# Patient Record
Sex: MALE | Race: WHITE | Employment: FULL TIME | URBAN - METROPOLITAN AREA
[De-identification: names, ages, dates, MRNs, and addresses within clinical notes are randomized per-mention and may not be internally consistent; named-entity substitution may affect disease eponyms.]

---

## 2023-05-01 ENCOUNTER — HOSPITAL ENCOUNTER (OUTPATIENT)
Facility: HOSPITAL | Age: 28
Discharge: HOME | End: 2023-05-03
Payer: COMMERCIAL

## 2023-05-01 ENCOUNTER — HOSPITAL ENCOUNTER (OUTPATIENT)
Facility: HOSPITAL | Age: 28
Discharge: HOME | End: 2023-05-04
Payer: COMMERCIAL

## 2023-05-01 DIAGNOSIS — J34.3 HYPERTROPHY OF NASAL TURBINATES: ICD-10-CM

## 2023-05-01 DIAGNOSIS — J34.2 DEVIATED NASAL SEPTUM: ICD-10-CM

## 2023-05-01 LAB
EKG ATRIAL RATE: 80 BPM
EKG DIAGNOSIS: NORMAL
EKG P AXIS: 17 DEGREES
EKG P-R INTERVAL: 150 MS
EKG Q-T INTERVAL: 350 MS
EKG QRS DURATION: 86 MS
EKG QTC CALCULATION (BAZETT): 403 MS
EKG R AXIS: 14 DEGREES
EKG T AXIS: 28 DEGREES
EKG VENTRICULAR RATE: 80 BPM
HCT VFR BLD AUTO: 54.7 % (ref 36–48)
HGB BLD-MCNC: 18.1 G/DL (ref 13–16)
POTASSIUM SERPL-SCNC: 4 MMOL/L (ref 3.5–5.5)

## 2023-05-01 PROCEDURE — 36415 COLL VENOUS BLD VENIPUNCTURE: CPT

## 2023-05-01 PROCEDURE — 85014 HEMATOCRIT: CPT

## 2023-05-01 PROCEDURE — 84132 ASSAY OF SERUM POTASSIUM: CPT

## 2023-05-01 PROCEDURE — 85018 HEMOGLOBIN: CPT

## 2023-05-01 PROCEDURE — 93005 ELECTROCARDIOGRAM TRACING: CPT

## 2023-05-08 ENCOUNTER — HOSPITAL ENCOUNTER (OUTPATIENT)
Facility: HOSPITAL | Age: 28
Setting detail: SPECIMEN
Discharge: HOME | End: 2023-05-11
Payer: COMMERCIAL

## 2023-05-08 PROCEDURE — 88304 TISSUE EXAM BY PATHOLOGIST: CPT

## 2023-05-08 PROCEDURE — 88311 DECALCIFY TISSUE: CPT

## 2023-11-15 RX ORDER — ROSUVASTATIN CALCIUM 20 MG/1
20 TABLET, COATED ORAL DAILY
COMMUNITY

## 2023-11-15 RX ORDER — SEMAGLUTIDE 1.34 MG/ML
INJECTION, SOLUTION SUBCUTANEOUS WEEKLY
COMMUNITY

## 2023-11-15 RX ORDER — TESTOSTERONE CYPIONATE, ALCOHOL 200 MG/ML
KIT INTRAMUSCULAR WEEKLY
COMMUNITY

## 2023-11-15 RX ORDER — PROPRANOLOL HYDROCHLORIDE 120 MG/1
120 CAPSULE, EXTENDED RELEASE ORAL DAILY
COMMUNITY

## 2023-11-15 RX ORDER — FLUTICASONE PROPIONATE 50 MCG
1 SPRAY, SUSPENSION (ML) NASAL DAILY
COMMUNITY

## 2023-11-15 RX ORDER — EMPAGLIFLOZIN, METFORMIN HYDROCHLORIDE 12.5; 1 MG/1; MG/1
2 TABLET, EXTENDED RELEASE ORAL DAILY
COMMUNITY

## 2023-11-15 RX ORDER — FEXOFENADINE HCL 180 MG/1
180 TABLET ORAL DAILY
COMMUNITY

## 2023-11-15 RX ORDER — VENLAFAXINE HYDROCHLORIDE 37.5 MG/1
37.5 CAPSULE, EXTENDED RELEASE ORAL DAILY
COMMUNITY

## 2023-11-15 NOTE — PERIOP NOTE
PAT Activity Status Questionnaire       Yes No Points for YES    Are you able to climb a flight of stairs or walk up a hill? [x] [] 1   Are you able to do heavy work around the house like lifting or moving heavy furniture? [x] [] 1   Do yardwork like raking leaves, weeding or pushing a power mower? [x] [] 1   Participate in strenuous sports like swimming, singles tennis, football, basketball or skiing? [x] [] 1   Total Score:       4       If TOTAL SCORE is <3, send chart for anesthesia review. Instructed to bring CPAP on admission. No removable prosthetic devices or family history of malignant hyperthermia. CHG wash x 3 days instructions reviewed. PCP is not aware of the surgery. No participation in clinical trial or research study. Do not bring any valuables on DOS- jewelry, wallet, cash, laptop, medications. Does not meet criteria for special population at this time. Possible time delay day of surgery reviewed. Instructed to bring Insurance card and ID on the day of surgery. DNR status- none. To come 11- for CBC, CMP, UA, Urine culture, EKG. Needs CHG also.

## 2023-11-20 ENCOUNTER — HOSPITAL ENCOUNTER (OUTPATIENT)
Facility: HOSPITAL | Age: 28
Discharge: HOME | End: 2023-11-23
Payer: COMMERCIAL

## 2023-11-20 DIAGNOSIS — N20.1 RIGHT URETERAL STONE: ICD-10-CM

## 2023-11-20 DIAGNOSIS — Z01.818 PRE-OP TESTING: ICD-10-CM

## 2023-11-20 LAB
ANION GAP SERPL CALC-SCNC: 5 MMOL/L (ref 3–18)
APPEARANCE UR: CLEAR
BILIRUB UR QL: NEGATIVE
BUN SERPL-MCNC: 16 MG/DL (ref 7–18)
BUN/CREAT SERPL: 17 (ref 12–20)
CALCIUM SERPL-MCNC: 9.5 MG/DL (ref 8.5–10.1)
CHLORIDE SERPL-SCNC: 103 MMOL/L (ref 100–111)
CO2 SERPL-SCNC: 33 MMOL/L (ref 21–32)
COLOR UR: YELLOW
CREAT SERPL-MCNC: 0.94 MG/DL (ref 0.6–1.3)
EKG ATRIAL RATE: 72 BPM
EKG DIAGNOSIS: NORMAL
EKG P AXIS: 27 DEGREES
EKG P-R INTERVAL: 142 MS
EKG Q-T INTERVAL: 378 MS
EKG QRS DURATION: 86 MS
EKG QTC CALCULATION (BAZETT): 413 MS
EKG R AXIS: 57 DEGREES
EKG T AXIS: 33 DEGREES
EKG VENTRICULAR RATE: 72 BPM
ERYTHROCYTE [DISTWIDTH] IN BLOOD BY AUTOMATED COUNT: 12.3 % (ref 11.6–14.5)
EST. AVERAGE GLUCOSE BLD GHB EST-MCNC: 108 MG/DL
GLUCOSE SERPL-MCNC: 89 MG/DL (ref 74–99)
GLUCOSE UR STRIP.AUTO-MCNC: >1000 MG/DL
HBA1C MFR BLD: 5.4 % (ref 4.2–5.6)
HCT VFR BLD AUTO: 51.8 % (ref 36–48)
HGB BLD-MCNC: 17.1 G/DL (ref 13–16)
HGB UR QL STRIP: NEGATIVE
KETONES UR QL STRIP.AUTO: NEGATIVE MG/DL
LEUKOCYTE ESTERASE UR QL STRIP.AUTO: NEGATIVE
MCH RBC QN AUTO: 30.3 PG (ref 24–34)
MCHC RBC AUTO-ENTMCNC: 33 G/DL (ref 31–37)
MCV RBC AUTO: 91.8 FL (ref 78–100)
NITRITE UR QL STRIP.AUTO: NEGATIVE
NRBC # BLD: 0 K/UL (ref 0–0.01)
NRBC BLD-RTO: 0 PER 100 WBC
PH UR STRIP: 5.5 (ref 5–8)
PLATELET # BLD AUTO: 302 K/UL (ref 135–420)
PMV BLD AUTO: 10.3 FL (ref 9.2–11.8)
POTASSIUM SERPL-SCNC: 4 MMOL/L (ref 3.5–5.5)
PROT UR STRIP-MCNC: NEGATIVE MG/DL
RBC # BLD AUTO: 5.64 M/UL (ref 4.35–5.65)
SODIUM SERPL-SCNC: 141 MMOL/L (ref 136–145)
SP GR UR REFRACTOMETRY: >1.03 (ref 1–1.03)
UROBILINOGEN UR QL STRIP.AUTO: 1 EU/DL (ref 0.2–1)
WBC # BLD AUTO: 11.4 K/UL (ref 4.6–13.2)

## 2023-11-20 PROCEDURE — 93005 ELECTROCARDIOGRAM TRACING: CPT | Performed by: UROLOGY

## 2023-11-20 PROCEDURE — 87086 URINE CULTURE/COLONY COUNT: CPT

## 2023-11-20 PROCEDURE — 83036 HEMOGLOBIN GLYCOSYLATED A1C: CPT

## 2023-11-20 PROCEDURE — 85027 COMPLETE CBC AUTOMATED: CPT

## 2023-11-20 PROCEDURE — 80048 BASIC METABOLIC PNL TOTAL CA: CPT

## 2023-11-20 PROCEDURE — 81003 URINALYSIS AUTO W/O SCOPE: CPT

## 2023-11-20 PROCEDURE — 93010 ELECTROCARDIOGRAM REPORT: CPT | Performed by: INTERNAL MEDICINE

## 2023-11-20 PROCEDURE — 36415 COLL VENOUS BLD VENIPUNCTURE: CPT

## 2023-11-21 LAB
BACTERIA SPEC CULT: NORMAL
SERVICE CMNT-IMP: NORMAL

## 2023-12-12 ENCOUNTER — HOSPITAL ENCOUNTER (OUTPATIENT)
Facility: HOSPITAL | Age: 28
Setting detail: OUTPATIENT SURGERY
Discharge: HOME | End: 2023-12-12
Attending: UROLOGY | Admitting: UROLOGY
Payer: COMMERCIAL

## 2023-12-12 ENCOUNTER — ANESTHESIA (OUTPATIENT)
Facility: HOSPITAL | Age: 28
End: 2023-12-12
Payer: COMMERCIAL

## 2023-12-12 ENCOUNTER — ANESTHESIA EVENT (OUTPATIENT)
Facility: HOSPITAL | Age: 28
End: 2023-12-12
Payer: COMMERCIAL

## 2023-12-12 ENCOUNTER — APPOINTMENT (OUTPATIENT)
Facility: HOSPITAL | Age: 28
End: 2023-12-12
Attending: UROLOGY
Payer: COMMERCIAL

## 2023-12-12 VITALS
SYSTOLIC BLOOD PRESSURE: 121 MMHG | OXYGEN SATURATION: 100 % | HEIGHT: 69 IN | DIASTOLIC BLOOD PRESSURE: 99 MMHG | HEART RATE: 76 BPM | RESPIRATION RATE: 16 BRPM | WEIGHT: 231.56 LBS | BODY MASS INDEX: 34.3 KG/M2 | TEMPERATURE: 97.7 F

## 2023-12-12 DIAGNOSIS — Z01.818 PRE-OP TESTING: Primary | ICD-10-CM

## 2023-12-12 DIAGNOSIS — N20.1 URETERAL STONE: ICD-10-CM

## 2023-12-12 PROBLEM — N20.0 KIDNEY STONE: Status: ACTIVE | Noted: 2023-12-12

## 2023-12-12 LAB
GLUCOSE BLD STRIP.AUTO-MCNC: 84 MG/DL (ref 70–110)
GLUCOSE BLD STRIP.AUTO-MCNC: 84 MG/DL (ref 70–110)

## 2023-12-12 PROCEDURE — 3600000002 HC SURGERY LEVEL 2 BASE: Performed by: UROLOGY

## 2023-12-12 PROCEDURE — 82360 CALCULUS ASSAY QUANT: CPT

## 2023-12-12 PROCEDURE — 7100000000 HC PACU RECOVERY - FIRST 15 MIN: Performed by: UROLOGY

## 2023-12-12 PROCEDURE — 2580000003 HC RX 258: Performed by: UROLOGY

## 2023-12-12 PROCEDURE — C1894 INTRO/SHEATH, NON-LASER: HCPCS | Performed by: UROLOGY

## 2023-12-12 PROCEDURE — 82962 GLUCOSE BLOOD TEST: CPT

## 2023-12-12 PROCEDURE — 6360000004 HC RX CONTRAST MEDICATION: Performed by: UROLOGY

## 2023-12-12 PROCEDURE — 7100000011 HC PHASE II RECOVERY - ADDTL 15 MIN: Performed by: UROLOGY

## 2023-12-12 PROCEDURE — 2500000003 HC RX 250 WO HCPCS: Performed by: NURSE ANESTHETIST, CERTIFIED REGISTERED

## 2023-12-12 PROCEDURE — 3700000001 HC ADD 15 MINUTES (ANESTHESIA): Performed by: UROLOGY

## 2023-12-12 PROCEDURE — 3600000012 HC SURGERY LEVEL 2 ADDTL 15MIN: Performed by: UROLOGY

## 2023-12-12 PROCEDURE — 3700000000 HC ANESTHESIA ATTENDED CARE: Performed by: UROLOGY

## 2023-12-12 PROCEDURE — 2720000010 HC SURG SUPPLY STERILE: Performed by: UROLOGY

## 2023-12-12 PROCEDURE — 7100000001 HC PACU RECOVERY - ADDTL 15 MIN: Performed by: UROLOGY

## 2023-12-12 PROCEDURE — 2709999900 HC NON-CHARGEABLE SUPPLY: Performed by: UROLOGY

## 2023-12-12 PROCEDURE — 6360000002 HC RX W HCPCS: Performed by: NURSE ANESTHETIST, CERTIFIED REGISTERED

## 2023-12-12 PROCEDURE — 74420 UROGRAPHY RTRGR +-KUB: CPT

## 2023-12-12 PROCEDURE — C1758 CATHETER, URETERAL: HCPCS | Performed by: UROLOGY

## 2023-12-12 PROCEDURE — C2617 STENT, NON-COR, TEM W/O DEL: HCPCS | Performed by: UROLOGY

## 2023-12-12 PROCEDURE — 6360000002 HC RX W HCPCS: Performed by: STUDENT IN AN ORGANIZED HEALTH CARE EDUCATION/TRAINING PROGRAM

## 2023-12-12 PROCEDURE — 7100000010 HC PHASE II RECOVERY - FIRST 15 MIN: Performed by: UROLOGY

## 2023-12-12 PROCEDURE — 6360000002 HC RX W HCPCS: Performed by: UROLOGY

## 2023-12-12 PROCEDURE — C1769 GUIDE WIRE: HCPCS | Performed by: UROLOGY

## 2023-12-12 DEVICE — VARIABLE LENGTH URETERAL STENT
Type: IMPLANTABLE DEVICE | Site: URETER | Status: FUNCTIONAL
Brand: CONTOUR VL™

## 2023-12-12 RX ORDER — DIPHENHYDRAMINE HYDROCHLORIDE 50 MG/ML
12.5 INJECTION INTRAMUSCULAR; INTRAVENOUS
Status: DISCONTINUED | OUTPATIENT
Start: 2023-12-12 | End: 2023-12-12 | Stop reason: HOSPADM

## 2023-12-12 RX ORDER — TRAMADOL HYDROCHLORIDE 50 MG/1
50 TABLET ORAL EVERY 6 HOURS PRN
Qty: 20 TABLET | Refills: 0 | Status: SHIPPED | OUTPATIENT
Start: 2023-12-12 | End: 2023-12-17

## 2023-12-12 RX ORDER — HYDROMORPHONE HYDROCHLORIDE 1 MG/ML
0.5 INJECTION, SOLUTION INTRAMUSCULAR; INTRAVENOUS; SUBCUTANEOUS EVERY 5 MIN PRN
Status: DISCONTINUED | OUTPATIENT
Start: 2023-12-12 | End: 2023-12-12 | Stop reason: HOSPADM

## 2023-12-12 RX ORDER — FENTANYL CITRATE 50 UG/ML
INJECTION, SOLUTION INTRAMUSCULAR; INTRAVENOUS PRN
Status: DISCONTINUED | OUTPATIENT
Start: 2023-12-12 | End: 2023-12-12 | Stop reason: SDUPTHER

## 2023-12-12 RX ORDER — MIDAZOLAM HYDROCHLORIDE 1 MG/ML
INJECTION INTRAMUSCULAR; INTRAVENOUS PRN
Status: DISCONTINUED | OUTPATIENT
Start: 2023-12-12 | End: 2023-12-12 | Stop reason: SDUPTHER

## 2023-12-12 RX ORDER — MIDAZOLAM HYDROCHLORIDE 2 MG/2ML
2 INJECTION, SOLUTION INTRAMUSCULAR; INTRAVENOUS
Status: DISCONTINUED | OUTPATIENT
Start: 2023-12-12 | End: 2023-12-12 | Stop reason: HOSPADM

## 2023-12-12 RX ORDER — SUCCINYLCHOLINE/SOD CL,ISO/PF 100 MG/5ML
SYRINGE (ML) INTRAVENOUS PRN
Status: DISCONTINUED | OUTPATIENT
Start: 2023-12-12 | End: 2023-12-12 | Stop reason: SDUPTHER

## 2023-12-12 RX ORDER — LABETALOL HYDROCHLORIDE 5 MG/ML
10 INJECTION, SOLUTION INTRAVENOUS
Status: DISCONTINUED | OUTPATIENT
Start: 2023-12-12 | End: 2023-12-12 | Stop reason: HOSPADM

## 2023-12-12 RX ORDER — ONDANSETRON 2 MG/ML
4 INJECTION INTRAMUSCULAR; INTRAVENOUS
Status: DISCONTINUED | OUTPATIENT
Start: 2023-12-12 | End: 2023-12-12 | Stop reason: HOSPADM

## 2023-12-12 RX ORDER — SODIUM CHLORIDE 0.9 % (FLUSH) 0.9 %
5-40 SYRINGE (ML) INJECTION EVERY 12 HOURS SCHEDULED
Status: DISCONTINUED | OUTPATIENT
Start: 2023-12-12 | End: 2023-12-12 | Stop reason: HOSPADM

## 2023-12-12 RX ORDER — SODIUM CHLORIDE, SODIUM LACTATE, POTASSIUM CHLORIDE, CALCIUM CHLORIDE 600; 310; 30; 20 MG/100ML; MG/100ML; MG/100ML; MG/100ML
INJECTION, SOLUTION INTRAVENOUS CONTINUOUS
Status: DISCONTINUED | OUTPATIENT
Start: 2023-12-12 | End: 2023-12-12 | Stop reason: HOSPADM

## 2023-12-12 RX ORDER — LEVOFLOXACIN 5 MG/ML
500 INJECTION, SOLUTION INTRAVENOUS
Status: COMPLETED | OUTPATIENT
Start: 2023-12-12 | End: 2023-12-12

## 2023-12-12 RX ORDER — SODIUM CHLORIDE 0.9 % (FLUSH) 0.9 %
5-40 SYRINGE (ML) INJECTION PRN
Status: DISCONTINUED | OUTPATIENT
Start: 2023-12-12 | End: 2023-12-12 | Stop reason: HOSPADM

## 2023-12-12 RX ORDER — LEVOFLOXACIN 500 MG/1
500 TABLET, FILM COATED ORAL DAILY
Qty: 3 TABLET | Refills: 0 | Status: SHIPPED | OUTPATIENT
Start: 2023-12-12 | End: 2023-12-15

## 2023-12-12 RX ORDER — PROPOFOL 10 MG/ML
INJECTION, EMULSION INTRAVENOUS PRN
Status: DISCONTINUED | OUTPATIENT
Start: 2023-12-12 | End: 2023-12-12 | Stop reason: SDUPTHER

## 2023-12-12 RX ORDER — SODIUM CHLORIDE 9 MG/ML
INJECTION, SOLUTION INTRAVENOUS PRN
Status: DISCONTINUED | OUTPATIENT
Start: 2023-12-12 | End: 2023-12-12 | Stop reason: HOSPADM

## 2023-12-12 RX ORDER — KETOROLAC TROMETHAMINE 10 MG/1
10 TABLET, FILM COATED ORAL EVERY 6 HOURS PRN
Qty: 20 TABLET | Refills: 0 | Status: SHIPPED | OUTPATIENT
Start: 2023-12-12 | End: 2024-12-11

## 2023-12-12 RX ORDER — DEXAMETHASONE SODIUM PHOSPHATE 4 MG/ML
INJECTION, SOLUTION INTRA-ARTICULAR; INTRALESIONAL; INTRAMUSCULAR; INTRAVENOUS; SOFT TISSUE PRN
Status: DISCONTINUED | OUTPATIENT
Start: 2023-12-12 | End: 2023-12-12 | Stop reason: SDUPTHER

## 2023-12-12 RX ORDER — DROPERIDOL 2.5 MG/ML
0.62 INJECTION, SOLUTION INTRAMUSCULAR; INTRAVENOUS
Status: DISCONTINUED | OUTPATIENT
Start: 2023-12-12 | End: 2023-12-12 | Stop reason: HOSPADM

## 2023-12-12 RX ORDER — FENTANYL CITRATE 50 UG/ML
50 INJECTION, SOLUTION INTRAMUSCULAR; INTRAVENOUS EVERY 5 MIN PRN
Status: DISCONTINUED | OUTPATIENT
Start: 2023-12-12 | End: 2023-12-12 | Stop reason: HOSPADM

## 2023-12-12 RX ORDER — IOPAMIDOL 612 MG/ML
INJECTION, SOLUTION INTRATHECAL PRN
Status: DISCONTINUED | OUTPATIENT
Start: 2023-12-12 | End: 2023-12-12 | Stop reason: ALTCHOICE

## 2023-12-12 RX ORDER — ONDANSETRON 2 MG/ML
INJECTION INTRAMUSCULAR; INTRAVENOUS PRN
Status: DISCONTINUED | OUTPATIENT
Start: 2023-12-12 | End: 2023-12-12 | Stop reason: SDUPTHER

## 2023-12-12 RX ORDER — PROPRANOLOL HCL 60 MG
120 CAPSULE, EXTENDED RELEASE 24HR ORAL ONCE
Status: DISCONTINUED | OUTPATIENT
Start: 2023-12-12 | End: 2023-12-12 | Stop reason: HOSPADM

## 2023-12-12 RX ORDER — LIDOCAINE HYDROCHLORIDE 20 MG/ML
INJECTION, SOLUTION EPIDURAL; INFILTRATION; INTRACAUDAL; PERINEURAL PRN
Status: DISCONTINUED | OUTPATIENT
Start: 2023-12-12 | End: 2023-12-12 | Stop reason: SDUPTHER

## 2023-12-12 RX ADMIN — LIDOCAINE HYDROCHLORIDE 5 ML: 20 INJECTION, SOLUTION EPIDURAL; INFILTRATION; INTRACAUDAL; PERINEURAL at 13:19

## 2023-12-12 RX ADMIN — FENTANYL CITRATE 50 MCG: 50 INJECTION, SOLUTION INTRAMUSCULAR; INTRAVENOUS at 13:39

## 2023-12-12 RX ADMIN — SODIUM CHLORIDE, SODIUM LACTATE, POTASSIUM CHLORIDE, AND CALCIUM CHLORIDE: 600; 310; 30; 20 INJECTION, SOLUTION INTRAVENOUS at 11:49

## 2023-12-12 RX ADMIN — ONDANSETRON HYDROCHLORIDE 4 MG: 2 INJECTION INTRAMUSCULAR; INTRAVENOUS at 13:37

## 2023-12-12 RX ADMIN — Medication 100 MG: at 13:19

## 2023-12-12 RX ADMIN — HYDROMORPHONE HYDROCHLORIDE 0.5 MG: 1 INJECTION, SOLUTION INTRAMUSCULAR; INTRAVENOUS; SUBCUTANEOUS at 14:46

## 2023-12-12 RX ADMIN — FENTANYL CITRATE 50 MCG: 50 INJECTION, SOLUTION INTRAMUSCULAR; INTRAVENOUS at 13:19

## 2023-12-12 RX ADMIN — MIDAZOLAM 2 MG: 1 INJECTION INTRAMUSCULAR; INTRAVENOUS at 13:16

## 2023-12-12 RX ADMIN — DEXAMETHASONE SODIUM PHOSPHATE 4 MG: 4 INJECTION, SOLUTION INTRAMUSCULAR; INTRAVENOUS at 13:37

## 2023-12-12 RX ADMIN — SODIUM CHLORIDE, SODIUM LACTATE, POTASSIUM CHLORIDE, AND CALCIUM CHLORIDE: 600; 310; 30; 20 INJECTION, SOLUTION INTRAVENOUS at 14:18

## 2023-12-12 RX ADMIN — HYDROMORPHONE HYDROCHLORIDE 0.5 MG: 1 INJECTION, SOLUTION INTRAMUSCULAR; INTRAVENOUS; SUBCUTANEOUS at 14:38

## 2023-12-12 RX ADMIN — PROPOFOL 200 MG: 10 INJECTION, EMULSION INTRAVENOUS at 13:19

## 2023-12-12 RX ADMIN — LEVOFLOXACIN 500 MG: 5 INJECTION, SOLUTION INTRAVENOUS at 13:23

## 2023-12-12 ASSESSMENT — PAIN - FUNCTIONAL ASSESSMENT
PAIN_FUNCTIONAL_ASSESSMENT: 0-10
PAIN_FUNCTIONAL_ASSESSMENT: ADULT NONVERBAL PAIN SCALE (NPVS)
PAIN_FUNCTIONAL_ASSESSMENT: 0-10

## 2023-12-12 ASSESSMENT — PAIN DESCRIPTION - DESCRIPTORS
DESCRIPTORS: BURNING;ACHING;PRESSURE
DESCRIPTORS: STABBING;SHARP

## 2023-12-12 ASSESSMENT — PAIN SCALES - GENERAL
PAINLEVEL_OUTOF10: 7
PAINLEVEL_OUTOF10: 7
PAINLEVEL_OUTOF10: 4
PAINLEVEL_OUTOF10: 5

## 2023-12-12 ASSESSMENT — PAIN DESCRIPTION - LOCATION
LOCATION: PENIS
LOCATION: PENIS;BACK
LOCATION: PENIS;BACK

## 2023-12-12 ASSESSMENT — PAIN DESCRIPTION - PAIN TYPE: TYPE: SURGICAL PAIN

## 2023-12-12 ASSESSMENT — PAIN DESCRIPTION - ORIENTATION: ORIENTATION: RIGHT

## 2023-12-12 NOTE — H&P
Urology [address] [facility],[address] [address]  Tel:  [phone]  Fax: [phone]    Patient : Luis Gr   YOB: 1995                   Assessment/Plan  # Detail Type Description    1. Assessment Gross hematuria (R31.0). Patient Plan Resolved. 2. Assessment History of kidney stones (X02.204). Patient Plan Patient with current stone right ureter. 3. Assessment Right ureteral stone (N20.1). Patient Plan Discussed with patient today considering his symptoms patient will need to undergo intervention. Unfortunately he has to be well as not a reasonable option considering stone cannot be seen on KUB to likely uric acid stone. Patient will benefit from intervention endoscopically with cystoscopy, right ureteroscopy laser lithotripsy right stent placement. Risk of stent pain discussed. Blood in the urine. Urgency frequency. He will undergo PAT per hospital protocol. All questions answered today. Script 4 Percocet sent today for pain control. Total time counseling coordinating care 30 minutes. Greater than 50% of the time on counseling and/or coordination of care. This time included review of diagnostic testing, prognosis, risks and benefits of treatment options, clinical impression, management instructions, the importance of compliance, and risk factor reduction. Additional Visit Information    This 25year old patient presents for Hematuria. History of Present Illness  1. Hematuria   Pertinent history includes history of kidney stones but not being over 36years of age, daily aspirin use, history of bladder cancer, history of UTIs, exposure to radiation and smoking or trauma. The patient also complains of urinary frequency (every 1 hour). The patient denies chills, dysuria, incomplete emptying, slow stream, urinary dribbling, urinary hesitancy, urinary incontinence, straining to urinate and urgency.  Additional

## 2023-12-12 NOTE — DISCHARGE INSTRUCTIONS
DISCHARGE SUMMARY from Nurse    PATIENT INSTRUCTIONS:    After general anesthesia or intravenous sedation, for 24 hours or while taking prescription Narcotics:  Limit your activities  Do not drive and operate hazardous machinery  Do not make important personal or business decisions  Do  not drink alcoholic beverages  If you have not urinated within 8 hours after discharge, please contact your surgeon on call. Report the following to your surgeon:  Excessive pain, swelling, redness or odor of or around the surgical area  Temperature over 100.5  Nausea and vomiting lasting longer than 4 hours or if unable to take medications  Any signs of decreased circulation or nerve impairment to extremity: change in color, persistent  numbness, tingling, coldness or increase pain  Any questions    What to do at Home:  Recommended activity: ,   REGULAR DIET DRINK LOTS OF LIQUIDS  OK TO SHOWER  PAT DRY AROUND STERI STRIPS ON PENIS  NO HEAVY LIFTING  DR BLANCO WILL CALL REGARDING A POST OP CHECK UP    If you experience any of the following symptoms heavy bleeding, unable to void, fevers,severe pain, please follow up with dr Scotty Carpio. *  Please give a list of your current medications to your Primary Care Provider. *  Please update this list whenever your medications are discontinued, doses are      changed, or new medications (including over-the-counter products) are added. *  Please carry medication information at all times in case of emergency situations. These are general instructions for a healthy lifestyle:    No smoking/ No tobacco products/ Avoid exposure to second hand smoke  Surgeon General's Warning:  Quitting smoking now greatly reduces serious risk to your health.     Obesity, smoking, and sedentary lifestyle greatly increases your risk for illness    A healthy diet, regular physical exercise & weight monitoring are important for maintaining a healthy lifestyle    You may be retaining fluid if you have a

## 2023-12-12 NOTE — PERIOP NOTE
Dr. Doc Perez informed pt last had Ozempic 5 days ago, and propanolol 2 days ago. Order received to give propanolol.

## 2023-12-12 NOTE — BRIEF OP NOTE
Brief Postoperative Note      Patient: Jeannie Britt  YOB: 1995  MRN: [id number]    Date of Procedure: 12/12/2023    Pre-Op Diagnosis Codes:     * Renal stone [N20.0]    Post-Op Diagnosis: Same       Procedure(s):  CYSTOSCOPY, RIGHT RETROGRADE PYELOGRAM WITH INTERPRETATION, RIGHT URETEROSCOPY, THULIUM LASER LITHOTRIPSY, STENT PLACEMENT WITH C-ARM    Surgeon(s):  Ishmael Galeana MD    Assistant:  NONE    Anesthesia: General    Estimated Blood Loss (mL): Minimal    Complications: None    Specimens:   ID Type Source Tests Collected by Time Destination   1 : RIGHT KIDNEY STONE Stone (Calculus) Kidney STONE ANALYSIS Ishmael Galeana MD 12/12/2023 1353        Implants:  Implant Name Type Inv. Item Serial No.  Lot No. LRB No. Used Action   STENT URET 4.8FR [id number] PERCFLX HYDR+ SFT PGTL TAPR TIP - [id number]  STENT URET 4.8FR X76-36MS PERCFLX HYDR+ SFT PGTL TAPR TIP  [employer] [facility] UROLOG[facility]- [id number] Right 1 Implanted         Drains:   Ureteral Drain/Stent 12/12/23 Right Ureter (Active)       Findings: STONE IN LOWER POLE FRAGMENTED EASILY.         Electronically signed by Liv Huddleston MD on 12/12/2023 at 2:18 PM

## 2023-12-12 NOTE — PERIOP NOTE
TRANSFER - OUT REPORT:    Verbal report given to Otilio Lynch  on [employer]  being transferred to phase 2  for routine post-op       Report consisted of patient's Situation, Background, Assessment and   Recommendations(SBAR). Information from the following report(s) Nurse Handoff Report, Adult Overview, Surgery Report, Intake/Output, MAR, Recent Results, and Cardiac Rhythm NSR  was reviewed with the receiving nurse. Lines:   Peripheral IV 12/12/23 Left;Posterior Hand (Active)   Site Assessment Clean, dry & intact 12/12/23 1448   Line Status Infusing 12/12/23 [facility] 16 Connections checked and tightened 12/12/23 1448   Phlebitis Assessment No symptoms 12/12/23 1448   Infiltration Assessment 0 12/12/23 1448   Alcohol Cap Used No 12/12/23 1149   Dressing Status Clean, dry & intact 12/12/23 1448   Dressing Type Transparent 12/12/23 1448   Dressing Intervention New 12/12/23 1149        Opportunity for questions and clarification was provided.       Patient transported with:  Registered Nurse

## 2023-12-12 NOTE — OP NOTE
ureteral catheter and a retrograde pyelogram was done in the usual manner using 14 mL of Isovue dye. There were no filling defects along the entire length of the ureter. Up in the kidney, there were no filling defects. There was no hydronephrosis. Next, I passed a sensor wire through the open-ended catheter up to the kidney. The open-ended catheter was removed. One wire was a safety wire, the other wire was a working wire. Over the working wire, I passed a dual-lumen catheter and passed a second sensor wire up to the kidney. The dual-lumen catheter was removed. Over the working wire, I passed an 11/13, 28-cm navigator sheath into the distal ureter. The inner workings of the sheath and the working wire were then removed. I then passed a flexible ureteroscope through the sheath up to the kidney. There were no stones along the length of the ureter as predicted by the retrograde pyelogram.  Up in the kidney, there was a long large stone in the lower pole. No other stones were seen. Using a ZeroTip basket and I moved the stone into the upper pole to make it easier to fragment and pass fragments. Using a 200-micron thulium laser fiber, I broke up the stone into smaller fragments. I grasped out the largest with a ZeroTip basket. I then went back up and I lasered the stone into just tiny dust and debris. By the end of the case, there were no significant fragments anywhere. I went into every roula multiple times. I slowly backed the scope and sheath down the ureter. The ureter was intact and unharmed and there were no fragments along the length of the ureter. I reinserted the cystoscope. Over the safety wire, I passed a 4.8-Lebanese variable-length stent. The wire was removed. Fluoroscopy confirmed the proximal coil in the kidney. The distal coil was noted to be in the bladder by direct vision.   At this point, the patient was taken out of lithotomy position, revived from anesthesia, and taken to

## 2023-12-12 NOTE — PERIOP NOTE
TRANSFER - IN REPORT:    Verbal report received from [facility] on [place]  being received from [facility] for routine post-op      Report consisted of patient's Situation, Background, Assessment and   Recommendations(SBAR). Information from the following report(s) Nurse Handoff Report was reviewed with the receiving nurse. Opportunity for questions and clarification was provided. Assessment completed upon patient's arrival to unit and care assumed.

## 2023-12-12 NOTE — PERIOP NOTE
Reviewed PTA medication list with patient/caregiver and patient/caregiver denies any additional medications. Patient admits to having a responsible adult care for them at home for at least 24 hours after surgery. Patient encouraged to use gown warming system and informed that using said warming gown to regulate body temperature prior to a procedure has been shown to help reduce the risks of blood clots and infection. Patient's pharmacy of choice verified and documented in PTA medication section. Dual skin assessment & fall risk band verification completed with Masood Hyatt RN.

## 2023-12-12 NOTE — PERIOP NOTE
TRANSFER - IN REPORT:    Verbal report received from OR  on [facility]  being received from OR  for routine post-op      Report consisted of patient's Situation, Background, Assessment and   Recommendations(SBAR). Information from the following report(s) Nurse Handoff Report, Adult Overview, Surgery Report, Intake/Output, and Cardiac Rhythm NSR  was reviewed with the receiving nurse. Opportunity for questions and clarification was provided. Assessment completed upon patient's arrival to unit and care assumed.

## (undated) DEVICE — CYSTO PACK: Brand: MEDLINE INDUSTRIES, INC.

## (undated) DEVICE — GLOVE ORANGE PI 7 1/2   MSG9075

## (undated) DEVICE — STRAP,POSITIONING,KNEE/BODY,FOAM,4X60": Brand: MEDLINE

## (undated) DEVICE — SEAL ENDOSCP INSTR 7FR BX SELF SEAL

## (undated) DEVICE — URETERAL ACCESS SHEATH SET: Brand: NAVIGATOR HD

## (undated) DEVICE — BAG PRESSURE INFUSION 3 W STOPCOCK 3000 CC PREMIERPRO DISP

## (undated) DEVICE — GUIDEWIRE UROLOGICAL STR 3 CM 0.038 INX150 CM DUAL-FLEX

## (undated) DEVICE — Device

## (undated) DEVICE — GARMENT,MEDLINE,DVT,INT,CALF,MED, GEN2: Brand: MEDLINE

## (undated) DEVICE — NITINOL STONE RETRIEVAL BASKET: Brand: ZERO TIP

## (undated) DEVICE — 200 MICRON SINGLE-USE FIBER ASSEMBLY WITH FLAT TIP: Brand: OPTILITE

## (undated) DEVICE — SOLUTION IRRIG 3000ML 0.9% SOD CHL FLX CONT 0797208] ICU MEDICAL INC]

## (undated) DEVICE — SEAL INSTR CYSTO ADJ BX PRT SEAL FOR ACC UP TO 6FR

## (undated) DEVICE — TRAP,MUCUS SPECIMEN,40CC: Brand: MEDLINE

## (undated) DEVICE — CATHETER URET L70CM OD6FR OPN END FLEXIMA

## (undated) DEVICE — DRAPE C ARM UNIV W41XL74IN CLR PLAS XR VELC CLSR POLY STRP

## (undated) DEVICE — DUAL LUMEN URETERAL CATHETER